# Patient Record
Sex: FEMALE | Race: WHITE | NOT HISPANIC OR LATINO | URBAN - METROPOLITAN AREA
[De-identification: names, ages, dates, MRNs, and addresses within clinical notes are randomized per-mention and may not be internally consistent; named-entity substitution may affect disease eponyms.]

---

## 2019-08-14 ENCOUNTER — OFFICE (OUTPATIENT)
Dept: URBAN - METROPOLITAN AREA CLINIC 78 | Facility: CLINIC | Age: 84
End: 2019-08-14
Payer: COMMERCIAL

## 2019-08-14 VITALS
DIASTOLIC BLOOD PRESSURE: 79 MMHG | WEIGHT: 112 LBS | HEART RATE: 62 BPM | TEMPERATURE: 98.3 F | SYSTOLIC BLOOD PRESSURE: 146 MMHG | HEIGHT: 62 IN

## 2019-08-14 DIAGNOSIS — D50.9 IRON DEFICIENCY ANEMIA, UNSPECIFIED: ICD-10-CM

## 2019-08-14 DIAGNOSIS — K92.1 MELENA: ICD-10-CM

## 2019-08-14 PROCEDURE — 99204 OFFICE O/P NEW MOD 45 MIN: CPT

## 2019-08-14 NOTE — SERVICEHPINOTES
BOBBY JOHNSTON   is a   89   year old    female who is being seen in consultation at the request of   NEDA SANTANA   for blood in stools. Her daughter is with her. She sees blood in her stools several times a week. She has a soft stool daily. She denies abdominal or rectal pain. Patient was seen by Dr. Ricci in 2013 for similar issues. Had a flex sig showing hemorrhoids but decided at last minute against banding. She was again seen in 2016 but did not end up having any procedure at that time. Patient broke her hip in December and had surgery for that - did well but did lose quite a bit of weight. Using a walker now. She is taking iron. Labs from March show Hgb 10.3, MCV 85, normal CMP, ferritin 34, iron sat 12, TIBC 257. Hgb was lower (9 range) at time of her hip surgery in December.